# Patient Record
Sex: MALE | Race: WHITE | URBAN - METROPOLITAN AREA
[De-identification: names, ages, dates, MRNs, and addresses within clinical notes are randomized per-mention and may not be internally consistent; named-entity substitution may affect disease eponyms.]

---

## 2018-03-15 ENCOUNTER — EVALUATION (OUTPATIENT)
Dept: SPEECH THERAPY | Age: 78
End: 2018-03-15
Payer: MEDICARE

## 2018-03-15 DIAGNOSIS — R13.13 PHARYNGEAL DYSPHAGIA: Primary | ICD-10-CM

## 2018-03-15 PROCEDURE — 92526 ORAL FUNCTION THERAPY: CPT | Performed by: SPEECH-LANGUAGE PATHOLOGIST

## 2018-03-15 PROCEDURE — 92610 EVALUATE SWALLOWING FUNCTION: CPT | Performed by: SPEECH-LANGUAGE PATHOLOGIST

## 2018-03-15 PROCEDURE — G8996 SWALLOW CURRENT STATUS: HCPCS | Performed by: SPEECH-LANGUAGE PATHOLOGIST

## 2018-03-15 PROCEDURE — G8997 SWALLOW GOAL STATUS: HCPCS | Performed by: SPEECH-LANGUAGE PATHOLOGIST

## 2018-03-16 NOTE — PROGRESS NOTES
SPEECH/LANGUAGE PATHOLOGY  BEDSIDE SWALLOWING EVALUATION      PATIENT NAME:  Conrado Pantoja        :      68years old  EVALUATION DATE:  3/15/2018  REFERRED BY:  DR. MORTEZA COMBS  REASON FOR REFERRAL:  DYSPHAGIA      SUMMARY OF EVALUATION    Lida Colorado completed a video swallow on 2018 at [facility]-.  Please refer to that report for full details. DYSPHAGIA DIAGNOSIS:  Moderate pharyngeal dysphagia     [] Malnutrition indicators have been identified and nursing has been notified to ensure a dietary consult is ordered. DIET RECOMMENDATIONS:    []   NPO - Nothing by mouth including oral meds. Consider   []  NG tube  []  PEG tube     [x]  Regular consistency foods with hin _consistency liquids     []   Dental soft with _consistency liquids  []  Dysphagia 3 (advanced) with _consistency liquids   []  Dysphagia 2 (mechanically altered) with_consistency liquids   []  Dysphagia 1 (pureed) with _consistency liquids   []  Initiate trial feeding under the Speech Pathologists supervision   []  Other:         FEEDING RECOMMENDATIONS:   []No assistance required    []Stand by assist   []Full assistance required   []Set up required   [x]Supervision with all PO intake    []Double swallow    []Chin tuck  []Multiple swallow     [x]Feed in upright position   [x]Small bites/sips   []Feed reclined 45 degrees_  [x]Alternate solids / liquids  []Check for oral pocketing  [x]No straw    [x]Throat clear       []Place food on left side  []Place food on right side  []Spoon sip liquids   []Effortful swallow  []Swan water protocol  []Modified Zettie Lighter water protocol     [x]Administer meds (crushedwhole) with: pudding/applesauce  []Administer meds via feeding tube  []Other    THERAPY RECOMMENDATIONS:     []  Therapy is not recommended     [x]  Therapy is recommended 1-2x/week for 4-8 weeksto:   []  Improve oral motor strength and range of motion   [x]  Improve tongue base retraction   [x]  Improve laryngeal

## 2018-03-19 ENCOUNTER — EVALUATION (OUTPATIENT)
Dept: SPEECH THERAPY | Age: 78
End: 2018-03-19
Payer: MEDICARE

## 2018-03-19 DIAGNOSIS — R13.13 PHARYNGEAL DYSPHAGIA: Primary | ICD-10-CM

## 2018-03-19 PROCEDURE — 92526 ORAL FUNCTION THERAPY: CPT | Performed by: SPEECH-LANGUAGE PATHOLOGIST

## 2018-03-19 NOTE — PROGRESS NOTES
Patient seen for 30 minute session this date. Patient is completing all exercises as instructed with no issues. He states that he has not had any issues with any consistencies during meals. We reviewed all safe swallow protocol and he reports compliance with all. Today, he tolerated 75 frozen swabs per DPNS with 47% reflex elicitation. Will continue. Dexter Leon.  Ema Escoto MA/CCC-SLP  [id number]

## 2018-03-21 ENCOUNTER — HOSPITAL ENCOUNTER (OUTPATIENT)
Age: 78
Discharge: HOME | End: 2018-03-23
Payer: MEDICARE

## 2018-03-21 LAB
INR BLD: 2.1
PROTHROMBIN TIME: 23.3 SEC (ref 9.3–12.4)

## 2018-03-21 PROCEDURE — 85610 PROTHROMBIN TIME: CPT

## 2018-03-22 ENCOUNTER — EVALUATION (OUTPATIENT)
Dept: SPEECH THERAPY | Age: 78
End: 2018-03-22
Payer: MEDICARE

## 2018-03-22 DIAGNOSIS — R13.13 PHARYNGEAL DYSPHAGIA: Primary | ICD-10-CM

## 2018-03-22 PROCEDURE — 92526 ORAL FUNCTION THERAPY: CPT | Performed by: SPEECH-LANGUAGE PATHOLOGIST

## 2018-03-22 NOTE — PROGRESS NOTES
Patient seen for 30 minute session this date. He reports that he feels he is swallowing easier and denied any s/s of distress with any consistency since last session. He reports that he continues to complete all exercises as instructed. Today, he tolerated 90 frozen swabs per DPNS with 38% reflex elicitation achieved. Will continue. Jessika Lagunas.  Kathy Lindsey MA/LARISSA-SLP  [id number]

## 2018-03-27 ENCOUNTER — EVALUATION (OUTPATIENT)
Dept: SPEECH THERAPY | Age: 78
End: 2018-03-27
Payer: MEDICARE

## 2018-03-27 DIAGNOSIS — R13.13 PHARYNGEAL DYSPHAGIA: Primary | ICD-10-CM

## 2018-03-27 PROCEDURE — 92526 ORAL FUNCTION THERAPY: CPT | Performed by: SPEECH-LANGUAGE PATHOLOGIST

## 2018-03-29 ENCOUNTER — EVALUATION (OUTPATIENT)
Dept: SPEECH THERAPY | Age: 78
End: 2018-03-29
Payer: MEDICARE

## 2018-03-29 DIAGNOSIS — R13.13 PHARYNGEAL DYSPHAGIA: Primary | ICD-10-CM

## 2018-03-29 PROCEDURE — G8996 SWALLOW CURRENT STATUS: HCPCS | Performed by: SPEECH-LANGUAGE PATHOLOGIST

## 2018-03-29 PROCEDURE — G8998 SWALLOW D/C STATUS: HCPCS | Performed by: SPEECH-LANGUAGE PATHOLOGIST

## 2018-03-29 PROCEDURE — G8997 SWALLOW GOAL STATUS: HCPCS | Performed by: SPEECH-LANGUAGE PATHOLOGIST

## 2018-03-29 PROCEDURE — 92526 ORAL FUNCTION THERAPY: CPT | Performed by: SPEECH-LANGUAGE PATHOLOGIST

## 2018-04-23 ENCOUNTER — HOSPITAL ENCOUNTER (OUTPATIENT)
Age: 78
Discharge: HOME | End: 2018-04-25
Payer: MEDICARE

## 2018-04-23 LAB
INR BLD: 2.2
PROTHROMBIN TIME: 24.7 SEC (ref 9.3–12.4)

## 2018-04-23 PROCEDURE — 85610 PROTHROMBIN TIME: CPT
